# Patient Record
Sex: FEMALE | URBAN - METROPOLITAN AREA
[De-identification: names, ages, dates, MRNs, and addresses within clinical notes are randomized per-mention and may not be internally consistent; named-entity substitution may affect disease eponyms.]

---
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
## 2025-01-10 NOTE — PROGRESS NOTES
Subjective:   2025  Vickie Charles is a 19 y.o.  female who presents for annual exam.     Just turned 14 when she got her period for the first time  Ho irreg cycles    Before was getting it once every few months  But recently for past few months getting it almost every 2 weeks    Reviewed irregular periods and work up/management. Discussed possible etiologies including hormonal vs anatomical. Anovulation  suspicious for PCOS. We reviewed the hormonal fluctuations that accompany a normal menstrual cycle and how this can change in the setting of PCOS to create a more anovulatory picture. Had long discussion with patient regarding definition and implications of PCOS. Discussed risk of concomitant insulin resistance, metabolic disorders, dyslipidemia and CV disease, endometrial hyperplasia, and infertility down the road as ramifications of obesity/PCOS.   Check PCOS lab as noted below.  She is not interested in cocps to regulate cycles at this time, would prefer to observe for now and track cycles.  I advised that if she ever were to go more than 90 days without a menstrual, to please contact my office that she would need a Provera withdrawal bleed for endometrial protection at that time     Discussed twice a month periods not normal and if continues for next 3-6 months may need regulation.   US rx given to r/o polyp as well    On a good med regimen now that does not want to start OCPs to mess with it    Discussed alternative forms of BC to help with regulation/bleeding incuding lng iud, depo.     LMP: 25  Periods are irregular,    The patient is not currently sexually active.   Problems with sexual activity denies      Current contraception: abstinence  H/o fibroids/ovarian cysts: no  History of STDs: no  Family history of uterine or ovarian cancer: no  Family history of breast cancer: yes- pat GM, mat aunt  Regular self breast exam: yes    Pap smear: at age 21  HPV vaccine status: yes    Urinary  "complaints denies  GI complaints denies  Pt does exercise    Lives with: family, in college          Past Medical History:   Diagnosis Date    Acne     Anxiety      Past Surgical History   Procedure Laterality Date    Adenoidectomy      [place] tooth extraction         Current Outpatient Medications:     buPROPion XL (WELLBUTRIN XL) 300 mg 24 hr tablet, Take 300 mg by mouth daily., Disp: , Rfl:     cetirizine (ZyrTEC) 10 mg tablet, Take 10 mg by mouth daily., Disp: , Rfl:     FLUoxetine (PROzac) 20 mg capsule, Take by mouth daily., Disp: , Rfl:     spironolactone (ALDACTONE) 50 mg tablet, TAKE 1 PILL TWICE DAILY WITH A MEAL, Disp: , Rfl:   Not on File  Social History     Socioeconomic History    Marital status: Single     Spouse name: None    Number of children: None    Years of education: None    Highest education level: None   Tobacco Use    Smoking status: Never    Smokeless tobacco: Never   Vaping Use    Vaping status: Never Used   Substance and Sexual Activity    Alcohol use: Yes    Drug use: Yes     Types: Marijuana    Sexual activity: Never      No family history on file.     Review of Systems   Constitutional: Negative.    HENT: Negative.     Respiratory: Negative.     Cardiovascular: Negative.    Gastrointestinal: Negative.    Endocrine: Negative.    Genitourinary:  Positive for menstrual problem.   Breast: Negative.   Musculoskeletal: Negative.    Skin: Negative.    Neurological: Negative.    Psychiatric/Behavioral: Negative.     All other systems reviewed and are negative.       Objective:  Visit Vitals  /80 (BP Location: Left upper arm, Patient Position: Sitting)   Ht 1.626 m (5' 4\")   Wt 74.4 kg (164 lb)   LMP 01/11/2025   BMI 28.15 kg/m²        Physical Exam:  Physical Exam  Constitutional:       Appearance: Normal appearance. She is normal weight.   HENT:      Head: Normocephalic and atraumatic.      Nose: Nose normal.     Eyes:      Extraocular Movements: Extraocular movements intact.      " Conjunctiva/sclera: Conjunctivae normal.      Pupils: Pupils are equal, round, and reactive to light.     Pulmonary:      Effort: Pulmonary effort is normal.   Musculoskeletal:         General: Normal range of motion.      Cervical back: Normal range of motion.   Neurological:      General: No focal deficit present.      Mental Status: She is alert and oriented to person, place, and time.   Skin:     General: Skin is warm and dry.   Psychiatric:         Mood and Affect: Mood normal.         Behavior: Behavior normal.         Thought Content: Thought content normal.         Judgment: Judgment normal.   Vitals reviewed.           Assessment and Plan:   Annual: Pap at age 21, periods reviewed  Self breast exam taught  Screening mammogram n/a  Contraceptive plan: n/a  STI screening: declines  Screening Colonoscopy at 45  Follows routine care with PCP   The following counseling was provided: Diet and Exercise: Smoking Cessation; Drug/Alcohol Abuse/ HIV and Safe Sex/ Domestic Violence/ Vitamin Supplementation    Diagnoses and all orders for this visit:    Encounter for breast cancer screening using non-mammogram modality    Screening examination for sexually transmitted disease    Encounter for other general counseling or advice on contraception    Abnormal uterine bleeding  -     ESTRADIOL  -     BhCG, Serum, Quant  -     Progesterone  -     DHEA-sulfate  -     FSH/LH  -     Hemoglobin A1c  -     TSH w reflex FT4  -     Testosterone  -     Prolactin  -     US PELVIS TRANSABDOMINAL & TRANSVAGINAL; Future    Well woman exam with routine gynecological exam    .  All questions answered..        Darlin Purcell MD

## 2025-01-13 ENCOUNTER — OFFICE VISIT (OUTPATIENT)
Dept: OBSTETRICS AND GYNECOLOGY | Facility: CLINIC | Age: 20
End: 2025-01-13
Payer: COMMERCIAL

## 2025-01-13 VITALS
DIASTOLIC BLOOD PRESSURE: 80 MMHG | SYSTOLIC BLOOD PRESSURE: 120 MMHG | HEIGHT: 64 IN | BODY MASS INDEX: 28 KG/M2 | WEIGHT: 164 LBS

## 2025-01-13 DIAGNOSIS — Z30.09 ENCOUNTER FOR OTHER GENERAL COUNSELING OR ADVICE ON CONTRACEPTION: ICD-10-CM

## 2025-01-13 DIAGNOSIS — N93.9 ABNORMAL UTERINE BLEEDING: ICD-10-CM

## 2025-01-13 DIAGNOSIS — Z12.39 ENCOUNTER FOR BREAST CANCER SCREENING USING NON-MAMMOGRAM MODALITY: Primary | ICD-10-CM

## 2025-01-13 DIAGNOSIS — Z11.3 SCREENING EXAMINATION FOR SEXUALLY TRANSMITTED DISEASE: ICD-10-CM

## 2025-01-13 DIAGNOSIS — Z01.419 WELL WOMAN EXAM WITH ROUTINE GYNECOLOGICAL EXAM: ICD-10-CM

## 2025-01-13 PROCEDURE — 3008F BODY MASS INDEX DOCD: CPT | Performed by: OBSTETRICS & GYNECOLOGY

## 2025-01-13 PROCEDURE — S0610 ANNUAL GYNECOLOGICAL EXAMINA: HCPCS | Performed by: OBSTETRICS & GYNECOLOGY

## 2025-01-13 RX ORDER — SPIRONOLACTONE 50 MG/1
TABLET, FILM COATED ORAL
COMMUNITY

## 2025-01-13 RX ORDER — FLUOXETINE HYDROCHLORIDE 20 MG/1
CAPSULE ORAL DAILY
COMMUNITY

## 2025-01-13 RX ORDER — CETIRIZINE HYDROCHLORIDE 10 MG/1
10 TABLET ORAL DAILY
COMMUNITY

## 2025-01-13 RX ORDER — BUPROPION HYDROCHLORIDE 300 MG/1
300 TABLET ORAL DAILY
COMMUNITY

## 2025-01-13 ASSESSMENT — ENCOUNTER SYMPTOMS
MUSCULOSKELETAL NEGATIVE: 1
PSYCHIATRIC NEGATIVE: 1
ENDOCRINE NEGATIVE: 1
CARDIOVASCULAR NEGATIVE: 1
GASTROINTESTINAL NEGATIVE: 1
CONSTITUTIONAL NEGATIVE: 1
NEUROLOGICAL NEGATIVE: 1
RESPIRATORY NEGATIVE: 1

## 2025-01-14 ENCOUNTER — HOSPITAL ENCOUNTER (OUTPATIENT)
Dept: RADIOLOGY | Facility: HOSPITAL | Age: 20
Discharge: HOME | End: 2025-01-14
Attending: OBSTETRICS & GYNECOLOGY
Payer: COMMERCIAL

## 2025-01-14 DIAGNOSIS — N93.9 ABNORMAL UTERINE BLEEDING: ICD-10-CM

## 2025-01-14 LAB — HBA1C MFR BLD: 5.4 % (ref 4.8–5.6)

## 2025-01-14 PROCEDURE — 76856 US EXAM PELVIC COMPLETE: CPT

## 2025-01-15 LAB
DHEA-S SERPL-MCNC: 481 UG/DL (ref 110–433.2)
ESTRADIOL SERPL-MCNC: 89.7 PG/ML
FSH SERPL-ACNC: 5.5 MIU/ML
HCG INTACT+B SERPL-ACNC: <1 MIU/ML
LH SERPL-ACNC: 27.3 MIU/ML
PROGEST SERPL-MCNC: 0.3 NG/ML
PROLACTIN SERPL-MCNC: 37.3 NG/ML (ref 4.8–33.4)
T4 FREE SERPL-MCNC: 1.12 NG/DL (ref 0.93–1.6)
TESTOST SERPL-MCNC: 47 NG/DL (ref 13–71)
TSH SERPL DL<=0.005 MIU/L-ACNC: 2 UIU/ML (ref 0.45–4.5)

## 2025-01-16 DIAGNOSIS — E22.1 HYPERPROLACTINEMIA (CMS/HCC): Primary | ICD-10-CM

## 2025-01-17 ENCOUNTER — TELEPHONE (OUTPATIENT)
Dept: OBSTETRICS AND GYNECOLOGY | Facility: CLINIC | Age: 20
End: 2025-01-17
Payer: COMMERCIAL

## 2025-01-17 NOTE — TELEPHONE ENCOUNTER
----- Message from Darlin Purcell sent at 1/16/2025  7:47 AM EST -----  Please call [facility], prolactin is high in addition to DHEA. May be PCOS. I would schedule appt hope guo to rule out reasons for high prolactin and in meantime, order brain MRI, I will place order to rule out any BENIGN causes of growths that can lead   to this. Possible cause of abnormal cycles however.  Also possible this slight elevation is from her ssri medications.

## 2025-01-23 ENCOUNTER — OFFICE VISIT (OUTPATIENT)
Dept: ENDOCRINOLOGY | Facility: CLINIC | Age: 20
End: 2025-01-23
Payer: COMMERCIAL

## 2025-01-23 VITALS
HEIGHT: 64 IN | OXYGEN SATURATION: 98 % | HEART RATE: 76 BPM | DIASTOLIC BLOOD PRESSURE: 66 MMHG | BODY MASS INDEX: 28.65 KG/M2 | WEIGHT: 167.8 LBS | SYSTOLIC BLOOD PRESSURE: 112 MMHG

## 2025-01-23 DIAGNOSIS — E22.1 HYPERPROLACTINEMIA (CMS/HCC): ICD-10-CM

## 2025-01-23 DIAGNOSIS — N92.6 IRREGULAR MENSES: Primary | ICD-10-CM

## 2025-01-23 DIAGNOSIS — R79.89 ELEVATED DEHYDROEPIANDROSTERONE SULFATE LEVEL: ICD-10-CM

## 2025-01-23 PROCEDURE — 3008F BODY MASS INDEX DOCD: CPT | Performed by: INTERNAL MEDICINE

## 2025-01-23 PROCEDURE — 99204 OFFICE O/P NEW MOD 45 MIN: CPT | Performed by: INTERNAL MEDICINE

## 2025-01-23 RX ORDER — MELOXICAM 15 MG/1
TABLET ORAL
COMMUNITY
Start: 2025-01-16

## 2025-01-23 RX ORDER — TACROLIMUS 1 MG/G
OINTMENT TOPICAL
COMMUNITY
Start: 2025-01-15

## 2025-01-23 NOTE — PROGRESS NOTES
"HPI  19 y.o. female with PMH presenting for evaluation and management of ?PCOS, elevated DHEAS and PRL  Referred by: OBGYDANYELLE Purcell    Recent history summarized as per review of records:  - Saw OBSHIMON Purcell 1/13/25 for annual visit. Noted menarche at 13 yo with irregular cycles since. Concerned for PCOS. Labs ordered. Was not interested in OCP at that time. Pelvic US ordered as well.    - 1/14 pelvic US normal  - 1/18 Dr. Purcell referred to endocrinology for elevated DHEAS and PRL, also ordered MRI    Pt's mother was present for the entire visit and helped provide elements of the HPI     Pt states has always had irregular periods. Finally went to GYN about it. Ordered US and labs. US was normal but had elevated DHEAS and PRL on labs. GYN thought she could have PCOS    Menstrual history: menarche 13 yo. Always had irregular menses. For first few years was a \"guess\", would skip multiple months and then would be normal and then would skip again. Then for a year was getting it q3 mo. Then since 1/2024 has been q2 weeks  LMP: last week, prior to that was 2 weeks before    Had seen GYN few years prior due to the irregular periods but they were told it might be able    Hirsutism: yes on chin and upper lip since middle school. Shaves upper lip hair q3-4 days, no change since initial onset, no improvement with spironolactone as below  Acne: yes. Used to take accutane and also on spironolactone 50 mg BID for past 6 mo which has helped.     Weight gain: Started college this year. Had lost some weigth related to sports but has gained 5-10 lb since being home for holidays in the past month. Fluctuates within 5-10 lb in general  Wt Readings from Last 3 Encounters:   01/23/25 76.1 kg (167 lb 12.8 oz) (91%, Z= 1.37)*   01/13/25 74.4 kg (164 lb) (90%, Z= 1.28)*     * Growth percentiles are based on [facility] (Girls, 2-20 Years) data.     Headaches: denies  Changes in vision: denies  Galactorrhea: denies    Striae: denies  Easy " bruising: denies  Facial plethora: only with exertion or anxiety/emotional changes  Fatigue: yes but not preventing her from playing rugby    H/o diabetes/prediabetes: denies    Fam hx abnormal periods: mother had infrequent periods as well but never got evaluated and required IVF to get pregnant     OCP use: never    Pregnancy history: never  Currently desiring pregnancy: no  Currently sexually active: same sex partners    Pt's biggest concern: irregular periods    -----------------------------------------------  PMH  Past Medical History:   Diagnosis Date    Acne     Anxiety      PSH  Past Surgical History   Procedure Laterality Date    Adenoidectomy      [place] tooth extraction       Family history  No family history on file.  Social history  Social History     Socioeconomic History    Marital status: Single     Spouse name: Not on file    Number of children: Not on file    Years of education: Not on file    Highest education level: Not on file   Occupational History    Not on file   Tobacco Use    Smoking status: Never    Smokeless tobacco: Never   Vaping Use    Vaping status: Never Used   Substance and Sexual Activity    Alcohol use: Yes    Drug use: Yes     Types: Marijuana    Sexual activity: Never   Other Topics Concern    Not on file   Social History Narrative    Not on file     Social Drivers of Health     Financial Resource Strain: Not on file   Food Insecurity: Not on file   Transportation Needs: Not on file   Physical Activity: Not on file   Stress: Not on file   Social Connections: Not on file   Intimate Partner Violence: Not on file   Housing Stability: Not on file     Medications  Current Outpatient Medications   Medication Instructions    buPROPion XL (WELLBUTRIN XL) 300 mg, Daily    cetirizine (ZYRTEC) 10 mg, Daily    FLUoxetine (PROzac) 20 mg capsule Daily    meloxicam (MOBIC) 15 mg tablet TAKE 1 TABLET BY MOUTH DAILY WITH FOOD FOR 14 DAYS    spironolactone (ALDACTONE) 50 mg tablet TAKE 1 PILL  "TWICE DAILY WITH A MEAL    tacrolimus (PROTOPIC) 0.1 % ointment APPLY TOPICALLY TO THE FACE TWICE DAILY     Allergies  Cat hair standardized allergenic extract  ----------------------------------------------------------  ROS: Complete ROS is otherwise negative except as mentioned in the HPI above    PHYSICAL EXAM  Visit Vitals  /66 (BP Location: Right upper arm, Patient Position: Sitting)   Pulse 76   Ht 1.626 m (5' 4\")   Wt 76.1 kg (167 lb 12.8 oz)   LMP 01/11/2025   SpO2 98%   BMI 28.80 kg/m²       Wt Readings from Last 3 Encounters:   01/23/25 76.1 kg (167 lb 12.8 oz) (91%, Z= 1.37)*   01/13/25 74.4 kg (164 lb) (90%, Z= 1.28)*     * Growth percentiles are based on CDC (Girls, 2-20 Years) data.       Gen: well nourished, no acute distress, no Cushingoid features  Eyes: no proptosis, normal conjunctiva  Neck: no thyromegaly, no nodules palpated, no acanthosis  CV: regular rate   Pulm: no use of accessory muscles, on room air  Abd: soft, non tender, non distended  Neuro: AAOx3  MSK: steady gait, no tremor of outstretched hands  Psych: normal mood, affect    LABS  No results found for: \"GLU\", \"GLUCOSE\", \"BUN\", \"CREATININE\", \"EGFR\", \"NA\", \"K\", \"CL\", \"CO2\", \"CALCIUM\", \"CA\", \"ALBUMIN\", \"PROT\", \"BILITOT\", \"ALKPHOS\", \"ALT\", \"AST\"  Hemoglobin A1c   Date Value Ref Range Status   01/14/2025 5.4 4.8 - 5.6 % Final     Comment:              Prediabetes: 5.7 - 6.4           Diabetes: >6.4           Glycemic control for adults with diabetes: <7.0        Testosterone, Serum   Date/Time Value Ref Range Status   01/14/2025 1003 47 13 - 71 ng/dL Final     DHEA-Sulfate   Date/Time Value Ref Range Status   01/14/2025 1003 481.0 (H) 110.0 - 433.2 ug/dL Final     Estradiol   Date/Time Value Ref Range Status   01/14/2025 1003 89.7 pg/mL Final     Comment:                          Adult Female             Range                        Follicular phase     12.5 - 166.0                        Ovulation phase      85.8 - 498.0            "             Luteal phase         43.8 - 211.0                        Postmenopausal       <6.0 -  54.7                       Pregnancy                        1st trimester     215.0 - >4300.0  [facility] ECLIA methodology       FSH   Date/Time Value Ref Range Status   01/14/2025 1003 5.5 mIU/mL Final     Comment:                          Adult Female             Range                        Follicular phase      3.5 -  12.5                        Ovulation phase       4.7 -  21.5                        Luteal phase          1.7 -   7.7                        Postmenopausal       25.8 - 134.8       LH   Date/Time Value Ref Range Status   01/14/2025 1003 27.3 mIU/mL Final     Comment:                          Adult Female              Range                        Follicular phase      2.4 -  12.6                        Ovulation phase      14.0 -  95.6                        Luteal phase          1.0 -  11.4                        Postmenopausal        7.7 -  58.5       Prolactin   Date/Time Value Ref Range Status   01/14/2025 1003 37.3 (H) 4.8 - 33.4 ng/mL Final     TSH   Date/Time Value Ref Range Status   01/14/2025 1003 2.000 0.450 - 4.500 uIU/mL Final     T4,Free(Direct)   Date/Time Value Ref Range Status   01/14/2025 1003 1.12 0.93 - 1.60 ng/dL Final     hCG,Beta Subunit,Qnt,Serum   Date/Time Value Ref Range Status   01/14/2025 1003 <1 mIU/mL Final     Comment:                          Female (Non-pregnant)    0 -     5                              (Postmenopausal)  0 -     8                       Female (Pregnant)                       Weeks of Gestation                               3                6 -    71                               4               10 -   599                               5              963 - 4163                               6              503 - 03120                               7             [id number] [id number]                               8            [id number] [id number]                                9            [id number] -[id number]                              10            [id number] [id number]                              12            [id number] -[id number]                              14            69466 - 33730                              15            88278 - 09772                              16             4737 - 58045                                           5689 - 66590                                           3725 - 09423  [facility] ECLIA methodology          IMAGING  US PELVIS TRANSABDOMINAL & TRANSVAGINAL 1/14/25    Narrative  CLINICAL HISTORY: N93.9: Abnormal uterine and vaginal bleeding, unspecified    PROCEDURE: Ultrasound examination of the pelvis was performed transabdominally and transvaginally. Transvaginal examination was performed to better evaluate the uterus and adnexa.    COMPARISON: None.    COMMENT:    UTERUS: Anteverted, measuring 3.3 cm x 4.8 cm x 7.1 cm. Homogeneous in echotexture.  No mass.    ENDOMETRIUM: Unremarkable. No abnormal vascularity on color Doppler. Maximum  thickness: 0.4 cm    RIGHT OVARY/ADNEXA: Within normal limits.  Ovarian size: 2.5 cm x  2.9 cm x  3.7 cm    LEFT OVARY/ADNEXA: Within normal limits.  Ovarian size: 2.7 cm x 2.3 cm x 2.2 cm    FLUID: There is a small amount of fluid in the cul-de-sac, likely physiologic.    IMPRESSION:    Normal-appearing pelvic ultrasound.    I have personally reviewed the images and agree with the radiology report. The following is my interpretation: Ovarian morphology not c/w PCOS       ASSESSMENT AND PLAN:     1. Secondary amenorrhea/hyperprolactinemia/elevated DHEAS  - Pt meets 2/3 [place] criteria for PCOS with irregular menses and clinical/biochemical hyperandrogenism  - Workup so far has shown normal TSH, testosterone, E2, LH, FSH, and hCG  - DHEAS and PRL were mildly elevated. Pt not c/o galactorrhea. More likely mild elevation in PRL is due to PCOS. No obvious offending meds. Would hold off on MRI for now but if develops galactorrhea or  PRL rises agree with MRI pituitary  - Check repeat DHEAS and PRL as well as 17OHP and LNSC x2 in 2-3 mo to rule out alternative causes and if negative then PCOS diagnosis will be confirmed. Will contact pt with results. Discussed with pt that NCCAH and Cushing's are extremely rare diagnoses and I have low clinical suspicion for either  - Counseled on associated conditions with PCOS including increased risk for insulin resistance/DM2, CVD, NAMRATA, endometrial cancer, and decreased fertility  - Counseled on importance of diet, exercise, and weight loss to attempt to regulate ovulation  - Pt's biggest concern is irregular menses. D/w pt that if she has PCOS the treatment would be OCP to regulate menses which she would prefer to avoid. She is having frequent enough periods that she does not absolutely need OCP for endometrial protection. Could also consider IUD   - Already on spironolactone 50 mg BID for past 6 mo as per derm which is helping with acne. D/w pt this is also part of treatment strategy for PCOS  - Also discussed trial of inositol if PCOS is diagnosed    Letter sent to referring provider  RTC 2-3 mo

## 2025-01-23 NOTE — PATIENT INSTRUCTIONS
Inositol - supplement for PCOS      Late-night salivary cortisol instructions  1. Do not brush teeth before collecting specimen.  2. Do not eat or drink for 15 minutes prior to specimen collection.  3. Collect specimen between 11 p.m. and midnight, and record collection time. Do the collection two nights in a row, one specimen each night.   4. To use the Salivette:   a. Remove top cap of container to expose swab.   b. Place swab directly into mouth by tipping container so swab falls into mouth. Do not touch swab with fingers.   c. Keep swab in mouth for approximately 2 minutes. Roll swab in mouth, do not chew swab.   d. Place swab back into its container without touching, and replace the cap.  5. Record collection time, and take appropriately labeled Salivette to laboratory. Keep Salivette in fridge until ready to take back to the laboratory.

## 2025-02-03 ENCOUNTER — TELEMEDICINE (OUTPATIENT)
Dept: OBSTETRICS AND GYNECOLOGY | Facility: CLINIC | Age: 20
End: 2025-02-03
Payer: COMMERCIAL

## 2025-02-03 VITALS — WEIGHT: 165 LBS | BODY MASS INDEX: 28.32 KG/M2

## 2025-02-03 DIAGNOSIS — R79.89 ELEVATED PROLACTIN LEVEL: Primary | ICD-10-CM

## 2025-02-03 DIAGNOSIS — R79.89 ELEVATED DEHYDROEPIANDROSTERONE (DHEA) LEVEL: ICD-10-CM

## 2025-02-03 DIAGNOSIS — N91.4 SECONDARY OLIGOMENORRHEA: ICD-10-CM

## 2025-02-03 PROCEDURE — 3008F BODY MASS INDEX DOCD: CPT | Mod: 95 | Performed by: OBSTETRICS & GYNECOLOGY

## 2025-02-03 PROCEDURE — 99213 OFFICE O/P EST LOW 20 MIN: CPT | Mod: 95 | Performed by: OBSTETRICS & GYNECOLOGY

## 2025-02-03 ASSESSMENT — ENCOUNTER SYMPTOMS
ENDOCRINE NEGATIVE: 1
RESPIRATORY NEGATIVE: 1
CARDIOVASCULAR NEGATIVE: 1
PSYCHIATRIC NEGATIVE: 1
MUSCULOSKELETAL NEGATIVE: 1
NEUROLOGICAL NEGATIVE: 1
CONSTITUTIONAL NEGATIVE: 1
GASTROINTESTINAL NEGATIVE: 1

## 2025-02-03 NOTE — PROGRESS NOTES
Verification of Patient Location:  The patient affirms they are currently located in the following state:[place]     Are you in your home or a private residence? Yes    EPIC VIDEO telehealth platform    Request for Consent:  You and I are about to have a telemedicine check-in or visit. This is allowed because you are already my patient, and you have requested it.  This telemedicine visit will be billed to your health insurance or you, if you are self-insured.  You understand you will be responsible for any copayments or coinsurances that apply to your telemedicine visit.  Before starting our telemedicine visit, I am required to get your consent for this virtual check-in or visit by telemedicine. Do you consent?      Patient Response to Request for Consent: Yes    The following have been reviewed and updated as appropriate in this visit:   Tobacco  Allergies  Meds  Med Hx  Surg Hx  Fam Hx  Soc Hx        Visit Documentation:    Visit Date: 2/3/2025   Vickie Charles is 19 y.o. female presenting today for follow up        HPI  Last saw Vickie 1/13 for ae and aub.   Got labs done and showed elevated prolactin, DHEA  Normal pelvic US  LH 27.3  Progesterone 0.3     Discussed clinically with elevated dhea (normal testosterone), unremarkable us, hirsutism, and oligomenorrhea, does not clinically meet criteria for PCOS. However has elevated dhea with elevated lh and oligomenorrhea, so can consider some imbalance.   Either way discussed options for regulation if she desires.  Reviewed hba1c and possible long term impacts on health with pcos.     Discussed methods to regulate cycles,but got onto a good regimen of other meds and did not want to introduce hormones to mess with that  OK to hold off on regulation of cycles with OCPs or lng iud.     Acne: yes. Used to take accutane and also on spironolactone 50 mg BID for past 6 mo which has helped.   Discussed cont with spironolactone to help manage elevated  androgens. At top level of dosing.       Saw endo  Reviewed prolacin  Hold off on mri  No symptoms clinically    US 25  UTERUS: Anteverted, measuring 3.3 cm x 4.8 cm x 7.1 cm. Homogeneous in  echotexture.  No mass.     ENDOMETRIUM: Unremarkable. No abnormal vascularity on color Doppler. Maximum  thickness: 0.4 cm     RIGHT OVARY/ADNEXA: Within normal limits.  Ovarian size: 2.5 cm x  2.9 cm x  3.7 cm     LEFT OVARY/ADNEXA: Within normal limits.  Ovarian size: 2.7 cm x 2.3 cm x 2.2 cm     FLUID: There is a small amount of fluid in the cul-de-sac, likely physiologic.        --  IMPRESSION:     Normal-appearing pelvic ultrasound      The following have been reviewed and updated as appropriate in this visit:         Visit Vitals  Wt 74.8 kg (165 lb)   LMP 2025 (Approximate)   BMI 28.32 kg/m²     Menstrual History:  OB History          0    Para   0    Term   0       0    AB   0    Living   0         SAB   0    IAB   0    Ectopic   0    Multiple   0    Live Births   0                Patient's last menstrual period was 2025 (approximate).           Medications:     Current Outpatient Medications:     buPROPion XL (WELLBUTRIN XL) 300 mg 24 hr tablet, Take 300 mg by mouth daily., Disp: , Rfl:     cetirizine (ZyrTEC) 10 mg tablet, Take 10 mg by mouth daily., Disp: , Rfl:     FLUoxetine (PROzac) 20 mg capsule, Take by mouth daily., Disp: , Rfl:     meloxicam (MOBIC) 15 mg tablet, TAKE 1 TABLET BY MOUTH DAILY WITH FOOD FOR 14 DAYS, Disp: , Rfl:     spironolactone (ALDACTONE) 50 mg tablet, TAKE 1 PILL TWICE DAILY WITH A MEAL, Disp: , Rfl:     tacrolimus (PROTOPIC) 0.1 % ointment, APPLY TOPICALLY TO THE FACE TWICE DAILY, Disp: , Rfl:     Allergies: is allergic to cat hair standardized allergenic extract.     Past Medical History:  has a past medical history of Acne and Anxiety.  Past Surgical History:  has a past surgical history that includes [place] tooth extraction and Adenoidectomy.  Family  History: family history is not on file.  Social History:   Social History     Tobacco Use    Smoking status: Never    Smokeless tobacco: Never   Vaping Use    Vaping status: Never Used   Substance Use Topics    Alcohol use: Yes    Drug use: Yes     Types: Marijuana       HPI  Review of Systems   Constitutional: Negative.    HENT: Negative.     Respiratory: Negative.     Cardiovascular: Negative.    Gastrointestinal: Negative.    Endocrine: Negative.    Genitourinary:  Positive for menstrual problem.   Breast: Negative.   Musculoskeletal: Negative.    Skin: Negative.    Neurological: Negative.    Psychiatric/Behavioral: Negative.     All other systems reviewed and are negative.    Physical Exam  Constitutional:       Appearance: Normal appearance. She is normal weight.   HENT:      Head: Normocephalic and atraumatic.      Nose: Nose normal.     Eyes:      Extraocular Movements: Extraocular movements intact.      Conjunctiva/sclera: Conjunctivae normal.      Pupils: Pupils are equal, round, and reactive to light.     Pulmonary:      Effort: Pulmonary effort is normal.   Musculoskeletal:         General: Normal range of motion.      Cervical back: Normal range of motion.   Neurological:      General: No focal deficit present.      Mental Status: She is alert and oriented to person, place, and time.   Skin:     General: Skin is warm and dry.   Psychiatric:         Mood and Affect: Mood normal.         Behavior: Behavior normal.         Thought Content: Thought content normal.         Judgment: Judgment normal.   Vitals reviewed.            Diagnoses and all orders for this visit:    Elevated prolactin level    Elevated dehydroepiandrosterone (DHEA) level    Secondary oligomenorrhea    .  All questions answered..    Plan  -repeat labs per endo  -hold off on hormonal regulation  -cont spironolactone  -healthy lifestyle changes  -track cycles, reach out if no period in 90 days and would give provera withdrawal  bleed      No follow-ups on file.  Darlin Purcell MD        Time Spent:  I spent 21 minutes on this date of service performing the following activities: obtaining history, entering orders, documenting, preparing for visit, obtaining / reviewing records, providing counseling and education, independently reviewing study/studies, communicating results, and coordinating care.

## 2025-02-28 ENCOUNTER — TELEPHONE (OUTPATIENT)
Dept: ENDOCRINOLOGY | Facility: CLINIC | Age: 20
End: 2025-02-28
Payer: COMMERCIAL

## 2025-03-04 DIAGNOSIS — N92.6 IRREGULAR MENSES: Primary | ICD-10-CM

## 2025-03-04 DIAGNOSIS — R79.89 ELEVATED DEHYDROEPIANDROSTERONE SULFATE LEVEL: ICD-10-CM

## 2025-03-10 ENCOUNTER — TELEMEDICINE (OUTPATIENT)
Dept: ENDOCRINOLOGY | Facility: CLINIC | Age: 20
End: 2025-03-10
Payer: COMMERCIAL

## 2025-03-10 VITALS — HEIGHT: 64 IN | WEIGHT: 170 LBS | BODY MASS INDEX: 29.02 KG/M2

## 2025-03-10 DIAGNOSIS — R79.89 ELEVATED DEHYDROEPIANDROSTERONE SULFATE LEVEL: ICD-10-CM

## 2025-03-10 DIAGNOSIS — N92.6 IRREGULAR MENSES: Primary | ICD-10-CM

## 2025-03-10 DIAGNOSIS — E22.1 HYPERPROLACTINEMIA (CMS/HCC): ICD-10-CM

## 2025-03-10 PROCEDURE — 99213 OFFICE O/P EST LOW 20 MIN: CPT | Mod: 95 | Performed by: INTERNAL MEDICINE

## 2025-03-10 PROCEDURE — 3008F BODY MASS INDEX DOCD: CPT | Mod: 95 | Performed by: INTERNAL MEDICINE

## 2025-03-10 NOTE — PROGRESS NOTES
Verification of Patient Location:  The patient affirms they are currently located in the following state: [place]    Are you in your home or a private residence? Yes    Request for Consent:    Audio and Video Encounter   Rios, my name is Melissa Montano MD.  Before we proceed, can you please verify your identification by telling me your full name and date of birth?  Can you tell me who is in the room with you?    You and I are about to have a telemedicine check-in or visit because you have requested it.  This is a live video-conference.  I am a real person, speaking to you in real time.  There is no one else with me on the video-conference. I am not recording this conversation and I am asking you not to record it.  This telemedicine visit will be billed to your health insurance or you, if you are self-insured.  You understand you will be responsible for any copayments or coinsurances that apply to your telemedicine visit.  Communication platform used for this encounter:  [employer] Video Visit (Epic Video Client)       Before starting our telemedicine visit, I am required to get your consent for this virtual check-in or visit by telemedicine. Do you consent?    Patient Response to Request for Consent:  Yes      Visit Documentation:  Subjective     Patient ID: Vickie Charles is a 19 y.o. female.  2005      HPI    The following have been reviewed and updated as appropriate in this visit:        Review of Systems    Assessment & Plan      Time Spent:  I spent  on this date of service performing the following activities: .

## 2025-03-10 NOTE — PROGRESS NOTES
"HPI  19 y.o. female with PMH presenting for follow up of ?PCOS, elevated DHEAS and PRL  Referred by: ORLY Purcell    Initial history (1/23/25):  - Saw ORLY Purcell 1/13/25 for annual visit. Noted menarche at 15 yo with irregular cycles since. Concerned for PCOS. Labs ordered. Was not interested in OCP at that time. Pelvic US ordered as well. 1/14/25 pelvic US normal. 1/18/25 Dr. Purcell referred to endocrinology for elevated DHEAS and PRL, also ordered MRI    First visit 1/23/25  Last visit 1/23/25. Ordered repeat labs    Interval history summarized as per review of records:  - Saw ORLY Purcell 2/3 in follow up. Treatment plan continued      Did labs at [facility] in [place] on 3/4/25    Menstrual history: menarche 15 yo. Always had irregular menses. For first few years was a \"guess\", would skip multiple months and then would be normal and then would skip again. Then for a year was getting it q3 mo. Then since 1/2024 has been q2 weeks  LMP: today, prior to this was 2 weeks ago    Hirsutism: yes on chin and upper lip since middle school. Shaves upper lip hair q3-4 days, no change since initial onset, no improvement with spironolactone as below  Acne: yes. Used to take accutane and also on spironolactone 50 mg BID for past 6 mo which has helped.     No change in symptoms since last visit    Weight gain: Fluctuates within 5-10 lb in general  Wt Readings from Last 3 Encounters:   03/10/25 77.1 kg (170 lb) (92%, Z= 1.41)*   02/03/25 74.8 kg (165 lb) (90%, Z= 1.30)*   01/23/25 76.1 kg (167 lb 12.8 oz) (91%, Z= 1.37)*     * Growth percentiles are based on CDC (Girls, 2-20 Years) data.     Headaches: denies  Changes in vision: denies  Galactorrhea: denies    Striae: denies  Easy bruising: denies  Facial plethora: only with exertion or anxiety/emotional changes  Fatigue: yes but not preventing her from playing rugby    H/o diabetes/prediabetes: denies    Fam hx abnormal periods: mother had " infrequent periods as well but never got evaluated and required IVF to get pregnant     OCP use: never    Pregnancy history: never  Currently desiring pregnancy: no  Currently sexually active: same sex partners    Pt's biggest concern: irregular periods    -----------------------------------------------  PMH  Past Medical History:   Diagnosis Date    Acne     Anxiety      PSH  Past Surgical History   Procedure Laterality Date    Adenoidectomy      [place] tooth extraction       Family history  No family history on file.  Social history  Social History     Socioeconomic History    Marital status: Single     Spouse name: Not on file    Number of children: Not on file    Years of education: Not on file    Highest education level: Not on file   Occupational History    Not on file   Tobacco Use    Smoking status: Never    Smokeless tobacco: Never   Vaping Use    Vaping status: Never Used   Substance and Sexual Activity    Alcohol use: Yes    Drug use: Yes     Types: Marijuana    Sexual activity: Never   Other Topics Concern    Not on file   Social History Narrative    Not on file     Social Drivers of Health     Financial Resource Strain: Not on file   Food Insecurity: Not on file   Transportation Needs: Not on file   Physical Activity: Not on file   Stress: Not on file   Social Connections: Not on file   Intimate Partner Violence: Not on file   Housing Stability: Not on file     Medications  Current Outpatient Medications   Medication Instructions    buPROPion XL (WELLBUTRIN XL) 300 mg, Daily    cetirizine (ZYRTEC) 10 mg, Daily    FLUoxetine (PROzac) 20 mg capsule Daily    meloxicam (MOBIC) 15 mg tablet TAKE 1 TABLET BY MOUTH DAILY WITH FOOD FOR 14 DAYS    spironolactone (ALDACTONE) 50 mg tablet TAKE 1 PILL TWICE DAILY WITH A MEAL    tacrolimus (PROTOPIC) 0.1 % ointment APPLY TOPICALLY TO THE FACE TWICE DAILY     Allergies  Cat hair standardized allergenic  "extract  ----------------------------------------------------------  ROS: Complete ROS is otherwise negative except as mentioned in the HPI above    PHYSICAL EXAM  Visit Vitals  Ht 1.626 m (5' 4\")   Wt 77.1 kg (170 lb)   BMI 29.18 kg/m²         Wt Readings from Last 3 Encounters:   03/10/25 77.1 kg (170 lb) (92%, Z= 1.41)*   02/03/25 74.8 kg (165 lb) (90%, Z= 1.30)*   01/23/25 76.1 kg (167 lb 12.8 oz) (91%, Z= 1.37)*     * Growth percentiles are based on [facility] (Girls, 2-20 Years) data.     2-way audio/video  Gen: well nourished, non-diaphoretic, no psychomotor agitation, no acute distress  HEENT: head- atraumatic, normocephalic, no rashes noted; eyes- conjunctiva are not injected, no swelling or discharge, no proptosis; mouth - normal appearing dentition  Nose: no erythema, swelling, discharge, or crusting  Neck: no obvious thyromegaly  Pulmonary: No Cough, no use of accessory muscles, speaking in clear sentences    LABS  No results found for: \"GLU\", \"GLUCOSE\", \"BUN\", \"CREATININE\", \"EGFR\", \"NA\", \"K\", \"CL\", \"CO2\", \"CALCIUM\", \"CA\", \"ALBUMIN\", \"PROT\", \"BILITOT\", \"ALKPHOS\", \"ALT\", \"AST\"  Hemoglobin A1c   Date Value Ref Range Status   01/14/2025 5.4 4.8 - 5.6 % Final     Comment:              Prediabetes: 5.7 - 6.4           Diabetes: >6.4           Glycemic control for adults with diabetes: <7.0        Testosterone, Serum   Date/Time Value Ref Range Status   01/14/2025 1003 47 13 - 71 ng/dL Final     DHEA-Sulfate   Date/Time Value Ref Range Status   01/14/2025 1003 481.0 (H) 110.0 - 433.2 ug/dL Final     Estradiol   Date/Time Value Ref Range Status   01/14/2025 1003 89.7 pg/mL Final     Comment:                          Adult Female             Range                        Follicular phase     12.5 - 166.0                        Ovulation phase      85.8 - 498.0                        Luteal phase         43.8 - 211.0                        Postmenopausal       <6.0 -  54.7                       Pregnancy                  "       1st trimester     215.0 - >4300.0  Roche ECLIA methodology       FSH   Date/Time Value Ref Range Status   01/14/2025 1003 5.5 mIU/mL Final     Comment:                          Adult Female             Range                        Follicular phase      3.5 -  12.5                        Ovulation phase       4.7 -  21.5                        Luteal phase          1.7 -   7.7                        Postmenopausal       25.8 - 134.8       LH   Date/Time Value Ref Range Status   01/14/2025 1003 27.3 mIU/mL Final     Comment:                          Adult Female              Range                        Follicular phase      2.4 -  12.6                        Ovulation phase      14.0 -  95.6                        Luteal phase          1.0 -  11.4                        Postmenopausal        7.7 -  58.5       Prolactin   Date/Time Value Ref Range Status   01/14/2025 1003 37.3 (H) 4.8 - 33.4 ng/mL Final     TSH   Date/Time Value Ref Range Status   01/14/2025 1003 2.000 0.450 - 4.500 uIU/mL Final     T4,Free(Direct)   Date/Time Value Ref Range Status   01/14/2025 1003 1.12 0.93 - 1.60 ng/dL Final     hCG,Beta Subunit,Qnt,Serum   Date/Time Value Ref Range Status   01/14/2025 1003 <1 mIU/mL Final     Comment:                          Female (Non-pregnant)    0 -     5                              (Postmenopausal)  0 -     8                       Female (Pregnant)                       Weeks of Gestation                               3                6 -    71                               4               10 -   750                               5              838 - 0914                               6              018 - 03582                               7             [id number] -[id number]                               8            27375 -[id number]                               9            [id number] -[id number]                              10            22031 -166393                              12            [id number] -[id number]                               14            17407 - 01669                              15            99709 - 48669                              16             9040 - 87708                              17             1075 - 48294                              18             9446 - 95751  Roche ECLIA methodology          IMAGING  US PELVIS TRANSABDOMINAL & TRANSVAGINAL 1/14/25    Narrative  CLINICAL HISTORY: N93.9: Abnormal uterine and vaginal bleeding, unspecified    PROCEDURE: Ultrasound examination of the pelvis was performed transabdominally and transvaginally. Transvaginal examination was performed to better evaluate the uterus and adnexa.    COMPARISON: None.    COMMENT:    UTERUS: Anteverted, measuring 3.3 cm x 4.8 cm x 7.1 cm. Homogeneous in echotexture.  No mass.    ENDOMETRIUM: Unremarkable. No abnormal vascularity on color Doppler. Maximum  thickness: 0.4 cm    RIGHT OVARY/ADNEXA: Within normal limits.  Ovarian size: 2.5 cm x  2.9 cm x  3.7 cm    LEFT OVARY/ADNEXA: Within normal limits.  Ovarian size: 2.7 cm x 2.3 cm x 2.2 cm    FLUID: There is a small amount of fluid in the cul-de-sac, likely physiologic.    IMPRESSION:    Normal-appearing pelvic ultrasound.    I personally reviewed the images on 1/23/25 and agree with the radiology report. The following is my interpretation: Ovarian morphology not c/w PCOS       ASSESSMENT AND PLAN:     1. Secondary amenorrhea/hyperprolactinemia/elevated DHEAS  - Pt meets 2/3 [place] criteria for PCOS with irregular menses and clinical/biochemical hyperandrogenism  - Workup so far has shown normal TSH, testosterone, E2, LH, FSH, and hCG  - DHEAS and PRL were mildly elevated 1/2025. Pt not c/o galactorrhea. More likely mild elevation in PRL is due to PCOS. No obvious offending meds. Would hold off on MRI for now but if develops galactorrhea or PRL rises agree with MRI pituitary  - Repeat DHEAS and PRL as well as 17OHP and LNSC x2 were ordered at last visit. Pt had labs drawn last  week. I do not have those records and pt states she also has not gotten any results. Will contact lab to inquire the status of her labs and then contact pt with results when they are available   - Previously counseled on associated conditions with PCOS including increased risk for insulin resistance/DM2, CVD, NAMRATA, endometrial cancer, and decreased fertility  - Previously counseled on importance of diet, exercise, and weight loss to attempt to regulate ovulation  - Pt's biggest concern is irregular menses. Previously d/w pt that if she has PCOS the treatment would be OCP to regulate menses which she would prefer to avoid. She is having frequent enough periods that she does not absolutely need OCP for endometrial protection. Could also consider IUD   - Already on spironolactone 50 mg BID for past 6 mo as per derm which is helping with acne. Previously d/w pt this is also part of treatment strategy for PCOS  - Also previously discussed trial of inositol if PCOS is diagnosed    RTC pending results

## 2025-04-12 ENCOUNTER — ANCILLARY PROCEDURE (OUTPATIENT)
Dept: URGENT CARE | Age: 20
End: 2025-04-12

## 2025-04-12 ENCOUNTER — OFFICE VISIT (OUTPATIENT)
Dept: URGENT CARE | Age: 20
End: 2025-04-12

## 2025-04-12 VITALS
TEMPERATURE: 97.4 F | HEART RATE: 88 BPM | DIASTOLIC BLOOD PRESSURE: 71 MMHG | SYSTOLIC BLOOD PRESSURE: 116 MMHG | OXYGEN SATURATION: 99 % | WEIGHT: 160 LBS | BODY MASS INDEX: 27.31 KG/M2 | HEIGHT: 64 IN | RESPIRATION RATE: 18 BRPM

## 2025-04-12 DIAGNOSIS — M25.572 ACUTE LEFT ANKLE PAIN: ICD-10-CM

## 2025-04-12 DIAGNOSIS — M25.572 ACUTE LEFT ANKLE PAIN: Primary | ICD-10-CM

## 2025-04-12 PROCEDURE — 73630 X-RAY EXAM OF FOOT: CPT | Mod: LEFT SIDE | Performed by: NURSE PRACTITIONER

## 2025-04-12 PROCEDURE — 73610 X-RAY EXAM OF ANKLE: CPT | Mod: LEFT SIDE | Performed by: NURSE PRACTITIONER

## 2025-04-12 ASSESSMENT — ENCOUNTER SYMPTOMS
MUSCULOSKELETAL NEGATIVE: 1
RESPIRATORY NEGATIVE: 1
HEMATOLOGIC/LYMPHATIC NEGATIVE: 1
ALLERGIC/IMMUNOLOGIC NEGATIVE: 1
PALPITATIONS: 0
ENDOCRINE NEGATIVE: 1
EYES NEGATIVE: 1
CONSTITUTIONAL NEGATIVE: 1
PSYCHIATRIC NEGATIVE: 1
NEUROLOGICAL NEGATIVE: 1
GASTROINTESTINAL NEGATIVE: 1

## 2025-04-12 NOTE — PROGRESS NOTES
"Subjective   Patient ID: Rosey Leiva \"Felicita\" is a 19 y.o. female. They present today with a chief complaint of Injury (Left ankle injury, started Wednesday or Thursday. Not sure if she sprained it. Swollen and maybe light bruising. ).    History of Present Illness  HPI    Pt presents to urgent care with c/o  left foot and ankle pain for the past several days.  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  She denies any specific injury.  She denies numbness or tingling.  She reports mild swelling to the lateral aspect of her ankle and foot  .  Pt denies CP, SOB, palpitations, fevers, abd pain, n/v/d, sick contacts, recent travel.        Past Medical History  Allergies as of 04/12/2025 - Reviewed 04/12/2025   Allergen Reaction Noted    Cat hair standardized allergenic extract Unknown 06/22/2024       (Not in a hospital admission)       No past medical history on file.    No past surgical history on file.         Review of Systems  Review of Systems   Constitutional: Negative.    HENT: Negative.     Eyes: Negative.    Respiratory: Negative.     Cardiovascular:  Negative for chest pain and palpitations.   Gastrointestinal: Negative.    Endocrine: Negative.    Genitourinary: Negative.    Musculoskeletal: Negative.    Skin: Negative.    Allergic/Immunologic: Negative.    Neurological: Negative.    Hematological: Negative.    Psychiatric/Behavioral: Negative.     All other systems reviewed and are negative.                                 Objective    Vitals:    04/12/25 1733   BP: 116/71   Pulse: 88   Resp: 18   Temp: 36.3 °C (97.4 °F)   TempSrc: Temporal   SpO2: 99%   Weight: 72.6 kg (160 lb)   Height: 1.626 m (5' 4\")     Patient's last menstrual period was 04/09/2025 (exact date).    Physical Exam  Vitals and nursing note reviewed.   Constitutional:       General: She is not in acute distress.     Appearance: Normal appearance. " She is not ill-appearing or toxic-appearing.   HENT:      Head: Atraumatic.      Mouth/Throat:      Mouth: Mucous membranes are moist.      Pharynx: Oropharynx is clear.   Eyes:      Extraocular Movements: Extraocular movements intact.      Conjunctiva/sclera: Conjunctivae normal.      Pupils: Pupils are equal, round, and reactive to light.   Cardiovascular:      Rate and Rhythm: Normal rate.   Pulmonary:      Effort: Pulmonary effort is normal.   Musculoskeletal:      Left lower leg: Swelling and tenderness present.   Skin:     General: Skin is warm and dry.   Neurological:      General: No focal deficit present.      Mental Status: She is alert and oriented to person, place, and time.   Psychiatric:         Mood and Affect: Mood normal.         Behavior: Behavior normal.         Thought Content: Thought content normal.         Procedures    Point of Care Test & Imaging Results from this visit  No results found for this visit on 04/12/25.   Imaging  XR foot left 3+ views    Result Date: 4/12/2025  No osseous injury is evident.   MACRO: None   Signed by: Miguel Broderick 4/12/2025 6:14 PM Dictation workstation:   [id number]    XR ankle left 3+ views    Result Date: 4/12/2025  No osseous injury is evident.   MACRO: None   Signed by: Miguel Broderick 4/12/2025 6:04 PM Dictation workstation:   [id number]     Cardiology, Vascular, and Other Imaging  No other imaging results found for the past 2 days      Diagnostic study results (if any) were reviewed by LAURENT Raya.    Assessment/Plan   Allergies, medications, history, and pertinent labs/EKGs/Imaging reviewed by LAURENT Raya.     Medical Decision Making    Patient with mild swelling along the lateral aspect of left foot and lateral malleolus of ankle. Toes are warm to touch with brisk cap refill.  Intact pulses.  ROM intact.    X-rays of foot and ankle negative for acute abnormality per radiology report.  Suspect musculoskeletal pain, sprain  as cause of patient's symptoms.  She was placed in an Ace wrap.  Advised RICE, OTC medications, follow-up with Ortho if not improving.At time of discharge patient was clinically well-appearing and HDS for outpatient management. The patient was educated regarding diagnosis, supportive care, OTC and Rx medications. The patient was given the opportunity to ask questions prior to discharge.  They verbalized understanding of my discussion of the plans for treatment, expected course, indications to return to  or seek further evaluation in ED, and the need for timely follow up as directed.   They were provided with a work/school excuse if requested.       Orders and Diagnoses  Diagnoses and all orders for this visit:  Acute left ankle pain  -     XR foot left 3+ views; Future  -     XR ankle left 3+ views; Future      Medical Admin Record      Patient disposition: Home    Electronically signed by LAURENT Raya  6:42 PM

## 2025-04-12 NOTE — LETTER
April 12, 2025     Patient: Rosey Leiva   YOB: 2005   Date of Visit: 4/12/2025       To Whom it May Concern:    Rosey Leiva was seen in my clinic on 4/12/2025. She may return to gym class or sports on 4/16/25 .    If you have any questions or concerns, please don't hesitate to call.         Sincerely,          SANTIAGO Raya-CNP        CC: No Recipients